# Patient Record
Sex: MALE | Race: WHITE | Employment: UNEMPLOYED | URBAN - METROPOLITAN AREA
[De-identification: names, ages, dates, MRNs, and addresses within clinical notes are randomized per-mention and may not be internally consistent; named-entity substitution may affect disease eponyms.]

---
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
## 2022-07-15 ENCOUNTER — HOSPITAL ENCOUNTER (INPATIENT)
Age: 22
LOS: 2 days | Discharge: HOME | DRG: 885 | End: 2022-07-18
Attending: EMERGENCY MEDICINE | Admitting: PSYCHIATRY & NEUROLOGY
Payer: COMMERCIAL

## 2022-07-15 DIAGNOSIS — R45.851 SUICIDAL IDEATION: Primary | ICD-10-CM

## 2022-07-15 LAB
ALBUMIN SERPL-MCNC: 5 G/DL (ref 3.5–5)
ALBUMIN/GLOB SERPL: 1.5 {RATIO} (ref 1.1–2.2)
ALP SERPL-CCNC: 103 U/L (ref 45–117)
ALT SERPL-CCNC: 31 U/L (ref 12–78)
AMPHET UR QL SCN: NEGATIVE
ANION GAP SERPL CALC-SCNC: 9 MMOL/L (ref 5–15)
APPEARANCE UR: ABNORMAL
AST SERPL-CCNC: 23 U/L (ref 15–37)
BACTERIA URNS QL MICRO: NEGATIVE /HPF
BARBITURATES UR QL SCN: NEGATIVE
BASOPHILS # BLD: 0.1 K/UL (ref 0–0.1)
BASOPHILS NFR BLD: 1 % (ref 0–1)
BENZODIAZ UR QL: POSITIVE
BILIRUB SERPL-MCNC: 1.1 MG/DL (ref 0.2–1)
BILIRUB UR QL: NEGATIVE
BUN SERPL-MCNC: 16 MG/DL (ref 6–20)
BUN/CREAT SERPL: 12 (ref 12–20)
CALCIUM SERPL-MCNC: 9.7 MG/DL (ref 8.5–10.1)
CANNABINOIDS UR QL SCN: POSITIVE
CHLORIDE SERPL-SCNC: 102 MMOL/L (ref 97–108)
CO2 SERPL-SCNC: 31 MMOL/L (ref 21–32)
COCAINE UR QL SCN: POSITIVE
COLOR UR: ABNORMAL
CREAT SERPL-MCNC: 1.32 MG/DL (ref 0.7–1.3)
DIFFERENTIAL METHOD BLD: ABNORMAL
DRUG SCRN COMMENT,DRGCM: ABNORMAL
EOSINOPHIL # BLD: 0.8 K/UL (ref 0–0.4)
EOSINOPHIL NFR BLD: 10 % (ref 0–7)
EPITH CASTS URNS QL MICRO: ABNORMAL /LPF
ERYTHROCYTE [DISTWIDTH] IN BLOOD BY AUTOMATED COUNT: 12.7 % (ref 11.5–14.5)
ETHANOL SERPL-MCNC: <10 MG/DL
FLUAV RNA SPEC QL NAA+PROBE: NOT DETECTED
FLUBV RNA SPEC QL NAA+PROBE: NOT DETECTED
GLOBULIN SER CALC-MCNC: 3.3 G/DL (ref 2–4)
GLUCOSE SERPL-MCNC: 78 MG/DL (ref 65–100)
GLUCOSE UR STRIP.AUTO-MCNC: NEGATIVE MG/DL
HCT VFR BLD AUTO: 48 % (ref 36.6–50.3)
HGB BLD-MCNC: 15.5 G/DL (ref 12.1–17)
HGB UR QL STRIP: NEGATIVE
HYALINE CASTS URNS QL MICRO: ABNORMAL /LPF (ref 0–5)
IMM GRANULOCYTES # BLD AUTO: 0.1 K/UL (ref 0–0.04)
IMM GRANULOCYTES NFR BLD AUTO: 1 % (ref 0–0.5)
KETONES UR QL STRIP.AUTO: ABNORMAL MG/DL
LEUKOCYTE ESTERASE UR QL STRIP.AUTO: NEGATIVE
LYMPHOCYTES # BLD: 2.6 K/UL (ref 0.8–3.5)
LYMPHOCYTES NFR BLD: 30 % (ref 12–49)
MCH RBC QN AUTO: 28.9 PG (ref 26–34)
MCHC RBC AUTO-ENTMCNC: 32.3 G/DL (ref 30–36.5)
MCV RBC AUTO: 89.4 FL (ref 80–99)
METHADONE UR QL: NEGATIVE
MONOCYTES # BLD: 1.1 K/UL (ref 0–1)
MONOCYTES NFR BLD: 12 % (ref 5–13)
MUCOUS THREADS URNS QL MICRO: ABNORMAL /LPF
NEUTS SEG # BLD: 4 K/UL (ref 1.8–8)
NEUTS SEG NFR BLD: 46 % (ref 32–75)
NITRITE UR QL STRIP.AUTO: NEGATIVE
NRBC # BLD: 0 K/UL (ref 0–0.01)
NRBC BLD-RTO: 0 PER 100 WBC
OPIATES UR QL: NEGATIVE
PCP UR QL: NEGATIVE
PH UR STRIP: 6.5 [PH] (ref 5–8)
PLATELET # BLD AUTO: 336 K/UL (ref 150–400)
PMV BLD AUTO: 10.3 FL (ref 8.9–12.9)
POTASSIUM SERPL-SCNC: 3.6 MMOL/L (ref 3.5–5.1)
PROT SERPL-MCNC: 8.3 G/DL (ref 6.4–8.2)
PROT UR STRIP-MCNC: 30 MG/DL
RBC # BLD AUTO: 5.37 M/UL (ref 4.1–5.7)
RBC #/AREA URNS HPF: ABNORMAL /HPF (ref 0–5)
SARS-COV-2, COV2: NOT DETECTED
SODIUM SERPL-SCNC: 142 MMOL/L (ref 136–145)
SP GR UR REFRACTOMETRY: 1.02 (ref 1–1.03)
UROBILINOGEN UR QL STRIP.AUTO: 1 EU/DL (ref 0.2–1)
WBC # BLD AUTO: 8.6 K/UL (ref 4.1–11.1)
WBC URNS QL MICRO: ABNORMAL /HPF (ref 0–4)

## 2022-07-15 PROCEDURE — 99285 EMERGENCY DEPT VISIT HI MDM: CPT

## 2022-07-15 PROCEDURE — 80307 DRUG TEST PRSMV CHEM ANLYZR: CPT

## 2022-07-15 PROCEDURE — 87636 SARSCOV2 & INF A&B AMP PRB: CPT

## 2022-07-15 PROCEDURE — 80053 COMPREHEN METABOLIC PANEL: CPT

## 2022-07-15 PROCEDURE — 81001 URINALYSIS AUTO W/SCOPE: CPT

## 2022-07-15 PROCEDURE — 82077 ASSAY SPEC XCP UR&BREATH IA: CPT

## 2022-07-15 PROCEDURE — 36415 COLL VENOUS BLD VENIPUNCTURE: CPT

## 2022-07-15 PROCEDURE — 85025 COMPLETE CBC W/AUTO DIFF WBC: CPT

## 2022-07-16 PROBLEM — F14.10 COCAINE USE DISORDER (HCC): Status: ACTIVE | Noted: 2022-07-16

## 2022-07-16 PROBLEM — F32.9 MDD (MAJOR DEPRESSIVE DISORDER): Status: ACTIVE | Noted: 2022-07-16

## 2022-07-16 LAB
ATRIAL RATE: 55 BPM
CALCULATED P AXIS, ECG09: 52 DEGREES
CALCULATED R AXIS, ECG10: 79 DEGREES
CALCULATED T AXIS, ECG11: 61 DEGREES
DIAGNOSIS, 93000: NORMAL
P-R INTERVAL, ECG05: 138 MS
Q-T INTERVAL, ECG07: 396 MS
QRS DURATION, ECG06: 84 MS
QTC CALCULATION (BEZET), ECG08: 378 MS
VENTRICULAR RATE, ECG03: 55 BPM

## 2022-07-16 PROCEDURE — 93005 ELECTROCARDIOGRAM TRACING: CPT

## 2022-07-16 PROCEDURE — 65220000003 HC RM SEMIPRIVATE PSYCH

## 2022-07-16 RX ORDER — ACETAMINOPHEN 325 MG/1
650 TABLET ORAL
Status: DISCONTINUED | OUTPATIENT
Start: 2022-07-16 | End: 2022-07-18 | Stop reason: HOSPADM

## 2022-07-16 RX ORDER — HYDROXYZINE 25 MG/1
50 TABLET, FILM COATED ORAL
Status: DISCONTINUED | OUTPATIENT
Start: 2022-07-16 | End: 2022-07-18 | Stop reason: HOSPADM

## 2022-07-16 RX ORDER — OLANZAPINE 5 MG/1
5 TABLET ORAL
Status: DISCONTINUED | OUTPATIENT
Start: 2022-07-16 | End: 2022-07-18 | Stop reason: HOSPADM

## 2022-07-16 RX ORDER — BENZTROPINE MESYLATE 1 MG/1
1 TABLET ORAL
Status: DISCONTINUED | OUTPATIENT
Start: 2022-07-16 | End: 2022-07-18 | Stop reason: HOSPADM

## 2022-07-16 RX ORDER — ADHESIVE BANDAGE
30 BANDAGE TOPICAL DAILY PRN
Status: DISCONTINUED | OUTPATIENT
Start: 2022-07-16 | End: 2022-07-18 | Stop reason: HOSPADM

## 2022-07-16 RX ORDER — MIDAZOLAM HYDROCHLORIDE 1 MG/ML
2 INJECTION, SOLUTION INTRAMUSCULAR; INTRAVENOUS
Status: DISCONTINUED | OUTPATIENT
Start: 2022-07-16 | End: 2022-07-18

## 2022-07-16 RX ORDER — DIPHENHYDRAMINE HYDROCHLORIDE 50 MG/ML
50 INJECTION, SOLUTION INTRAMUSCULAR; INTRAVENOUS
Status: DISCONTINUED | OUTPATIENT
Start: 2022-07-16 | End: 2022-07-18 | Stop reason: HOSPADM

## 2022-07-16 RX ORDER — HALOPERIDOL 5 MG/ML
5 INJECTION INTRAMUSCULAR
Status: DISCONTINUED | OUTPATIENT
Start: 2022-07-16 | End: 2022-07-18 | Stop reason: HOSPADM

## 2022-07-16 RX ORDER — LORAZEPAM 2 MG/ML
1 INJECTION INTRAMUSCULAR
Status: DISCONTINUED | OUTPATIENT
Start: 2022-07-16 | End: 2022-07-16

## 2022-07-16 RX ORDER — TRAZODONE HYDROCHLORIDE 50 MG/1
50 TABLET ORAL
Status: DISCONTINUED | OUTPATIENT
Start: 2022-07-16 | End: 2022-07-18 | Stop reason: HOSPADM

## 2022-07-16 NOTE — ED NOTES
Patient presents to the ED under an ECO with [employer]. Pt unsure why he is here. Denies HI or SI  Denies hallucinations or delusions. Reports using cocaine, xanax and weed. Denies alcohol use.

## 2022-07-16 NOTE — PROGRESS NOTES
Pt met in bedroom, appears asleep. Responds to verbal stimuli. Denies SI/HI/AVH. Denies pain, denies any further needs. Cooperative with morning vitals. Encouraged to participate in unit based activities and treatment team. Will continue to monitor.    Problem: Suicide  Goal: *STG: Remains safe in hospital  Outcome: Progressing Towards Goal  Goal: *STG/LTG: No longer expresses self destructive or suicidal thoughts  Outcome: Progressing Towards Goal
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
## 2022-07-16 NOTE — PROGRESS NOTES
[facility] Admission Pharmacy Medication Reconciliation     Information obtained from:Patient   RxQuery data available1:None    Comments/recommendations:    Medication reconciliation performed with patient via telephone. Patient denies taking any prescribed or over-the-counter medications prior to admission  Patient denies medication allergies and states preferred pharmacy as [address]. Medication changes (since last review): Added  None   Removed  None  Adjusted  None    The [place] Prescription Monitoring Program () was accessed to determine fill history of any controlled medications. No record found. 1RxQuery pharmacy benefit data reflects medications filled and processed through the patient's insurance, however                this data does NOT capture whether the medication was picked up or is currently being taken by the patient. Patient allergies:    Allergies as of 07/15/2022    (No Known Allergies)     None      Thank you,     Kira Crowe, PharmD   Contact: [phone]
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
## 2022-07-16 NOTE — PROGRESS NOTES
Auto-MST trigger per RN nutrition assessment. No acute nutrition or weight issues identified. Pt meal compliant. Double portions ordered. Hx notable for substance abuse. Ht: 5'10\"  Wt: 160 lb  BMI: 22.96 kb/(m^2) c/w normal weight.   Est energy needs: 2175 kcal, 72 g protein, 1 mL/kcal fluids  Pt will consume > 75% of meals at follow up 7-10 days  LOS, MST
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
## 2022-07-17 PROCEDURE — 74011250637 HC RX REV CODE- 250/637

## 2022-07-17 PROCEDURE — 65220000003 HC RM SEMIPRIVATE PSYCH

## 2022-07-17 RX ADMIN — TRAZODONE HYDROCHLORIDE 50 MG: 50 TABLET ORAL at 21:25

## 2022-07-17 RX ADMIN — HYDROXYZINE HYDROCHLORIDE 50 MG: 25 TABLET ORAL at 21:25

## 2022-07-17 NOTE — PROGRESS NOTES
Pt met in hallway, alert and oriented. Denies SI/HI/AVH, reports minimal depression related to situation. Reports frustration over hospitalization. Denies any further needs. Will continue to monitor.    Problem: Suicide  Goal: *STG: Remains safe in hospital  Outcome: Progressing Towards Goal  Goal: *STG/LTG: No longer expresses self destructive or suicidal thoughts  Outcome: Progressing Towards Goal

## 2022-07-17 NOTE — PROGRESS NOTES
Problem: Falls - Risk of  Goal: *Absence of Falls  Description: Document [place]via Fall Risk and appropriate interventions in the flowsheet. Outcome: Progressing Towards Goal  Note: Fall Risk Interventions:    Problem: Suicide  Goal: *STG: Remains safe in hospital  Outcome: Progressing Towards Goal     23 y/o male pt received this evening awake in [place]. Pt is A&Ox4 with \"depressed\" mood and sad affect which is mood congruent. Pt denied SI/HI/AVH. Pt expressed frustration about being hospitalized and placed under a TDO and denies having any true current or past suicidal ideation. Will continue to monitor pt. 7.25 hours of sleep logged.

## 2022-07-18 VITALS
HEIGHT: 70 IN | SYSTOLIC BLOOD PRESSURE: 133 MMHG | DIASTOLIC BLOOD PRESSURE: 71 MMHG | BODY MASS INDEX: 22.9 KG/M2 | RESPIRATION RATE: 16 BRPM | TEMPERATURE: 97.5 F | HEART RATE: 75 BPM | OXYGEN SATURATION: 100 % | WEIGHT: 160 LBS

## 2022-07-18 PROCEDURE — 74011250637 HC RX REV CODE- 250/637

## 2022-07-18 RX ADMIN — HYDROXYZINE HYDROCHLORIDE 50 MG: 25 TABLET ORAL at 09:24

## 2022-07-18 NOTE — PROGRESS NOTES
Spiritual Care Assessment/Progress Note  [facility]      NAME: Luann Lazar      MRN: [id number]  AGE: 22 y.o. SEX: male  Mormon Affiliation: No Caodaism   Language: English     7/18/2022     Total Time (in minutes): 5     Spiritual Assessment begun in [place] [id number] through conversation with:         []Patient        [] Family    [] Friend(s)        Reason for Consult: Initial/Spiritual assessment, patient floor     Spiritual beliefs: (Please include comment if needed)     [] Identifies with a nupur tradition:         [] Supported by a nupur community:            [] Claims no spiritual orientation:           [] Seeking spiritual identity:                [] Adheres to an individual form of spirituality:           [x] Not able to assess:                           Identified resources for coping:      [] Prayer                               [] Music                  [] Guided Imagery     [] Family/friends                 [] Pet visits     [] Devotional reading                         [x] Unknown     [] Other:                                    Interventions offered during this visit: (See comments for more details)                Plan of Care:     [] Support spiritual and/or cultural needs    [] Support AMD and/or advance care planning process      [] Support grieving process   [] Coordinate Rites and/or Rituals    [] Coordination with community clergy   [] No spiritual needs identified at this time   [] Detailed Plan of Care below (See Comments)  [] Make referral to Music Therapy  [] Make referral to Pet Therapy     [] Make referral to Addiction services  [] Make referral to [facility]  [] Make referral to Spiritual Care Partner  [] No future visits requested        [x] Contact Spiritual Care for further referrals     Comments:  visit for initial spiritual assessment. Patient resting at the time of this visit.   Please contact spiritual care for further referral or consult. Rev.  Vasile Magana MDiv, [facility], [facility] paging service: [phone]

## 2022-07-18 NOTE — BH NOTES
PSYCHIATRIC PROGRESS NOTE       Patient: Dalila Tamayo MRN: [id number]  SSN: [id number]    YOB: 2000  Age: 22 y.o. Sex: male      Admit Date: 7/15/2022    LOS: 1 day       Chief Complaint:  I am now a tdo. Interval History:  Dalila Tamayo says he is fine. States he spoke to [facility] yesterday and is now a tdo. His hearing is scheduled in the morning. Says he's been talking to his parents and it went well. His plan is to go to rehab. He says he is a little depressed, which he thinks is from substance use. He declined to be started on any medication for mood. He denies si hi or avh. Calm and pleasant during the interview. Nursing staff report no issues. Past Medical History:  Past Medical History:   Diagnosis Date    Asthma     Impetigo          ALLERGIES:(reviewed/updated 7/17/2022)  No Known Allergies    Laboratory report:  Lab Results   Component Value Date/Time    WBC 8.6 07/15/2022 10:57 PM    HGB 15.5 07/15/2022 10:57 PM    HCT 48.0 07/15/2022 10:57 PM    PLATELET 661 54/03/7138 10:57 PM    MCV 89.4 07/15/2022 10:57 PM      Lab Results   Component Value Date/Time    Sodium 142 07/15/2022 10:57 PM    Potassium 3.6 07/15/2022 10:57 PM    Chloride 102 07/15/2022 10:57 PM    CO2 31 07/15/2022 10:57 PM    Anion gap 9 07/15/2022 10:57 PM    Glucose 78 07/15/2022 10:57 PM    BUN 16 07/15/2022 10:57 PM    Creatinine 1.32 (H) 07/15/2022 10:57 PM    BUN/Creatinine ratio 12 07/15/2022 10:57 PM    GFR est AA >60 07/15/2022 10:57 PM    GFR est non-AA >60 07/15/2022 10:57 PM    Calcium 9.7 07/15/2022 10:57 PM    Bilirubin, total 1.1 (H) 07/15/2022 10:57 PM    Alk.  phosphatase 103 07/15/2022 10:57 PM    Protein, total 8.3 (H) 07/15/2022 10:57 PM    Albumin 5.0 07/15/2022 10:57 PM    Globulin 3.3 07/15/2022 10:57 PM    A-G Ratio 1.5 07/15/2022 10:57 PM    ALT (SGPT) 31 07/15/2022 10:57 PM      Vitals:    07/16/22 0245 07/16/22 0823 07/16/22 2020 07/17/22 0748   BP: 134/83 (!) 117/50 130/79 (!) 134/91 Pulse: 83 60 68 (!) 56   Resp: 16 16 16 18   Temp: 97.4 °F (36.3 °C) 97.7 °F (36.5 °C) 98.4 °F (36.9 °C) 97.7 °F (36.5 °C)   SpO2: 100% 99% 99% 100%   Weight:       Height:           No results found for: VALF2, VALAC, VALP, VALPR, DS6, CRBAM, CRBAMP, CARB2, XCRBAM  No results found for: LITHM    Vital Signs  Patient Vitals for the past 24 hrs:   Temp Pulse Resp BP SpO2   07/17/22 0748 97.7 °F (36.5 °C) (!) 56 18 (!) 134/91 100 %     Wt Readings from Last 3 Encounters:   07/15/22 72.6 kg (160 lb)   08/23/18 62.6 kg (47 %, Z= -0.09)*   03/25/16 58.4 kg (71 %, Z= 0.55)*     * Growth percentiles are based on [facility] (Boys, 2-20 Years) data. Temp Readings from Last 3 Encounters:   07/17/22 97.7 °F (36.5 °C)   08/23/18 98.3 °F (36.8 °C)   03/25/16 98.9 °F (37.2 °C)     BP Readings from Last 3 Encounters:   07/17/22 (!) 134/91   08/23/18 91/57 (1 %, Z = -2.33 /  18 %, Z = -0.92)*   03/25/16 124/45     *BP percentiles are based on the 2017 AAP Clinical Practice Guideline for boys     Pulse Readings from Last 3 Encounters:   07/17/22 (!) 56   08/23/18 75   03/25/16 74       Radiology (reviewed/updated 7/17/2022)  No results found.     Current Facility-Administered Medications   Medication Dose Route Frequency Provider Last Rate Last Admin    OLANZapine (ZyPREXA) tablet 5 mg  5 mg Oral Q6H PRN Charls Perla, NP        haloperidol lactate (HALDOL) injection 5 mg  5 mg IntraMUSCular Q6H PRN Charls Perla, NP        benztropine (COGENTIN) tablet 1 mg  1 mg Oral BID PRN Charls Perla, NP        diphenhydrAMINE (BENADRYL) injection 50 mg  50 mg IntraMUSCular BID PRN Charls Perla, NP        hydrOXYzine HCL (ATARAX) tablet 50 mg  50 mg Oral TID PRN Charls Perla, NP        traZODone (DESYREL) tablet 50 mg  50 mg Oral QHS PRN Charls Perla, NP        acetaminophen (TYLENOL) tablet 650 mg  650 mg Oral Q4H PRN Charls Perla, NP        magnesium hydroxide (MILK OF MAGNESIA) 400 mg/5 mL oral suspension 30 mL  30 mL Oral DAILY PRN Afshan Arnold NP        midazolam (VERSED) injection 2 mg  2 mg IntraMUSCular Q4H PRN Shelly Evans MD           Side Effects: (reviewed/updated 7/17/2022)  None reported or admitted to. Review of Systems: (reviewed/updated 7/17/2022)  Appetite: good  Sleep: good   All other Review of Systems: negative    Mental Status Exam:  Eye contact: Good eye contact  Psychomotor activity: wnl  Speech is spontaneous  Thought process: Logical and goal directed   Mood is \"ok\"  Affect:Blunted  Perception: No avh  Suicidal ideation: No si  Homicidal ideation: No hi  Insight/judgment: Partial   Cognition is grossly intact      Physical Exam:  Musculoskeletal system: steady gait  Tremor not present  Cog wheeling not present      Assessment and Plan:  Srinivasan Shah meets criteria for a diagnosis of Unspecified mood disorder. Benzodiazepine use disorder. Stimulant use disorder, cocaine. Cannabis use disorder. TDO hearing in am.  Continue current medications as prescribed. We will closely monitor for safety. We will encourage reality orientation. Disposition planning to continue. I certify that this patients inpatient psychiatric hospital services furnished since the previous certification were, and continue to be, required for treatment that could reasonably be expected to improve the patient's condition, or for diagnostic study, and that the patient continues to need, on a daily basis, active treatment furnished directly by or requiring the supervision of inpatient psychiatric facility personnel. In addition, the hospital records show that services furnished were intensive treatment services, admission or related services, or equivalent services.       Signed:  Fernanda Razo NP  7/17/2022

## 2022-07-18 NOTE — PROGRESS NOTES
Matty Escobar presented alert and oriented x4, broad affect, mood anxious. He was noted in the dayroom talking on the phone. He denied SI/HI/AVH. He received Atarax for anxiety and Trazodone for sleep. Pt appeared to have slept approx 8 hours during the night. No distress observed. Monitoring for safety continues.

## 2022-07-18 NOTE — BH NOTES
This writer spoke with patient's mother Gali Christiansen [phone]. She reports that she is able to  patient when she gets off at 2:30. She reports that she does not have any concerns for his discharge and that outpatient services are already set up for him.          JASMIN Fernández

## 2022-07-18 NOTE — BH NOTES
Attempted to contact patient's mother Mc Patel to inform her that patient should not have access to guns and find out where the guns have been removed too. No answer left voicemail        Spoke with Kellen Dang, he reports that the following was discussed during the hearing (with mom being present via telephone). Sugar Onofre reports that patient testified on the record to relinquish his rights to his guns. Patient's ex girlfriend has access to the guns.  Patient will not have access to guns until he completes and outpatient program.     Jessica Sosa, MSW

## 2022-07-18 NOTE — PROGRESS NOTES
Problem: Anxiety  Goal: *Alleviation of anxiety  Outcome: Progressing Towards Goal     Problem: Suicide  Goal: *STG: Remains safe in hospital  Outcome: Progressing Towards Goal  Goal: *STG/LTG: No longer expresses self destructive or suicidal thoughts  Outcome: Progressing Towards Goal     Problem: Falls - Risk of  Goal: *Absence of Falls  Description: Document [place] Fall Risk and appropriate interventions in the flowsheet.   Outcome: Progressing Towards Goal  Note: Fall Risk Interventions:     Medication Interventions: Teach patient to arise slowly

## 2022-07-18 NOTE — BH NOTES
GROUP THERAPY PROGRESS NOTE    Kristie Newman is participating in Safety Planning group    Group time: 1 hour    Personal goal for participation: To prepare for discharge by completing a safety plan and discussing their answers with the group. Also to participate in breathing exercises and individual activity. Goal orientation: personal    Group therapy participation: minimal    Therapeutic interventions reviewed and discussed:     Impression of participation: The pt remained in session and completed the safety plan but did not engage in the group discussion.

## 2022-07-18 NOTE — GROUP NOTE
LAURA  GROUP DOCUMENTATION INDIVIDUAL                                                                          Group Therapy Note    Date: 7/18/2022    Group Start Time: 1000  Group End Time: 1100  Group Topic: Topic Group    [facility] [place] [address] ACUTE [facility]AV [facility]    [place], [address] [facility] GROUP DOCUMENTATION GROUP    Group Therapy Note    Attendees: 7         Attendance: Attended    Patient's Goal:  To identify positive and negative coping skills    Interventions/techniques: Supported-discussion    Follows Directions:  Followed directions    Interactions: Interacted appropriately    Mental Status: Calm    Behavior/appearance: Attentive, Cooperative and Needed prompting    Goals Achieved: Able to engage in interactions, Able to listen to others, Able to self-disclose and Discussed coping      Lucy Matias

## 2022-07-18 NOTE — PROGRESS NOTES
Patient received resting in his room. Denies SI/HI/AVH and depression, endorses an anxiety level of 9. Calm, cooperative, pleasant, out on the unit, isolative to self. No scheduled morning medications. PRN Atarax administered for anxiety. Will continue to monitor for safety. 1530:  Patient released to the custody of his mother. All belongings returned. Discharge instructions reviewed with patient.
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
## 2023-06-08 ENCOUNTER — APPOINTMENT (OUTPATIENT)
Facility: HOSPITAL | Age: 23
End: 2023-06-08
Payer: COMMERCIAL

## 2023-06-08 ENCOUNTER — HOSPITAL ENCOUNTER (EMERGENCY)
Facility: HOSPITAL | Age: 23
Discharge: HOME | End: 2023-06-08
Attending: EMERGENCY MEDICINE
Payer: COMMERCIAL

## 2023-06-08 VITALS
WEIGHT: 146.39 LBS | SYSTOLIC BLOOD PRESSURE: 132 MMHG | OXYGEN SATURATION: 97 % | HEIGHT: 68 IN | BODY MASS INDEX: 22.19 KG/M2 | DIASTOLIC BLOOD PRESSURE: 77 MMHG | RESPIRATION RATE: 16 BRPM | HEART RATE: 65 BPM | TEMPERATURE: 97.5 F

## 2023-06-08 DIAGNOSIS — F41.1 ANXIETY STATE: ICD-10-CM

## 2023-06-08 DIAGNOSIS — S43.015A ANTERIOR DISLOCATION OF LEFT SHOULDER, INITIAL ENCOUNTER: Primary | ICD-10-CM

## 2023-06-08 PROCEDURE — 96374 THER/PROPH/DIAG INJ IV PUSH: CPT

## 2023-06-08 PROCEDURE — 6360000002 HC RX W HCPCS

## 2023-06-08 PROCEDURE — 73200 CT UPPER EXTREMITY W/O DYE: CPT

## 2023-06-08 PROCEDURE — 6360000002 HC RX W HCPCS: Performed by: EMERGENCY MEDICINE

## 2023-06-08 PROCEDURE — 6370000000 HC RX 637 (ALT 250 FOR IP)

## 2023-06-08 PROCEDURE — 23655 CLTX SHO DSLC W/MNPJ W/ANES: CPT

## 2023-06-08 PROCEDURE — 99285 EMERGENCY DEPT VISIT HI MDM: CPT

## 2023-06-08 RX ORDER — PROPOFOL 10 MG/ML
INJECTION, EMULSION INTRAVENOUS
Status: COMPLETED
Start: 2023-06-08 | End: 2023-06-08

## 2023-06-08 RX ORDER — FENTANYL CITRATE 50 UG/ML
INJECTION, SOLUTION INTRAMUSCULAR; INTRAVENOUS
Status: COMPLETED
Start: 2023-06-08 | End: 2023-06-08

## 2023-06-08 RX ORDER — PROPOFOL 10 MG/ML
INJECTION, EMULSION INTRAVENOUS
Status: DISCONTINUED
Start: 2023-06-08 | End: 2023-06-08 | Stop reason: HOSPADM

## 2023-06-08 RX ORDER — PROPOFOL 10 MG/ML
100 INJECTION, EMULSION INTRAVENOUS
Status: COMPLETED | OUTPATIENT
Start: 2023-06-08 | End: 2023-06-08

## 2023-06-08 RX ORDER — IBUPROFEN 800 MG/1
800 TABLET ORAL EVERY 8 HOURS PRN
Qty: 30 TABLET | Refills: 0 | Status: SHIPPED | OUTPATIENT
Start: 2023-06-08

## 2023-06-08 RX ORDER — LORAZEPAM 1 MG/1
2 TABLET ORAL ONCE
Status: COMPLETED | OUTPATIENT
Start: 2023-06-08 | End: 2023-06-08

## 2023-06-08 RX ORDER — LIDOCAINE HYDROCHLORIDE 10 MG/ML
20 INJECTION, SOLUTION EPIDURAL; INFILTRATION; INTRACAUDAL; PERINEURAL ONCE
Status: DISCONTINUED | OUTPATIENT
Start: 2023-06-08 | End: 2023-06-08

## 2023-06-08 RX ADMIN — FENTANYL CITRATE 100 MCG: 50 INJECTION, SOLUTION INTRAMUSCULAR; INTRAVENOUS at 14:41

## 2023-06-08 RX ADMIN — LORAZEPAM 2 MG: 1 TABLET ORAL at 14:16

## 2023-06-08 RX ADMIN — PROPOFOL 50 MG: 10 INJECTION, EMULSION INTRAVENOUS at 14:41

## 2023-06-08 RX ADMIN — PROPOFOL 100 MG: 10 INJECTION, EMULSION INTRAVENOUS at 14:26

## 2023-06-08 ASSESSMENT — LIFESTYLE VARIABLES
HOW MANY STANDARD DRINKS CONTAINING ALCOHOL DO YOU HAVE ON A TYPICAL DAY: PATIENT DOES NOT DRINK
HOW OFTEN DO YOU HAVE A DRINK CONTAINING ALCOHOL: MONTHLY OR LESS

## 2023-06-08 ASSESSMENT — ENCOUNTER SYMPTOMS
CHEST TIGHTNESS: 0
COUGH: 0
SHORTNESS OF BREATH: 0
GASTROINTESTINAL NEGATIVE: 1
STRIDOR: 0
WHEEZING: 0

## 2023-06-08 ASSESSMENT — PAIN - FUNCTIONAL ASSESSMENT: PAIN_FUNCTIONAL_ASSESSMENT: 0-10

## 2023-06-08 ASSESSMENT — PAIN SCALES - GENERAL
PAINLEVEL_OUTOF10: 2
PAINLEVEL_OUTOF10: 9

## 2023-06-08 ASSESSMENT — PAIN DESCRIPTION - LOCATION: LOCATION: SHOULDER

## 2023-06-08 NOTE — CONSULTS
procedure. Plan:     Continue to wear the sling, okay to remove for bathing, but keep arm by your side  Do not lift the left arm away from your body  OK for active range of motion of the hand, wrist, elbow    CT scan ordered for bony evaluation of the GH/ surrounding structures, surgical planning     Dr. Walter Ryan aware and agrees with plan as above.         Andrade Cunningham, PA-C  [employer]

## 2023-06-08 NOTE — ED NOTES
Pulled fentanyl 100 mg and propofol on override to assist MD and primary RN with conscious sedation procedure. Unable to return initial larger bottle of propofol. Gave to Emma pharmacist to place back in [facility]. Only needed smaller dose for pt.      Emelyn Buckley RN  06/08/23 220 Doris Mayer RN  06/08/23 5831

## 2023-06-08 NOTE — ED PROVIDER NOTES
Attendance: Constant attendance by certified staff until patient recovered      Recovery: Patient returned to pre-procedure baseline      Post-sedation assessments completed and reviewed: airway patency      Specimens recovered:  None    Patient is stable for discharge or admission: yes      Procedure completion:  Tolerated well, no immediate complications       Damien Abbott MD  06/08/23 6104
grammatical, syntax, homophones, and other interpretive errors are inadvertently transcribed by the computer software. Please disregards these errors.  Please excuse any errors that have escaped final proofreading.)        ELEAZAR Lawrence CNP  06/08/23 [id number], APRN - CNP  06/08/23 6992

## 2023-06-08 NOTE — ED NOTES
Patient received Propofol via Dr Maria Guadalupe Perrin:    1426 60MG  1427 40mg  1428: 50mg  1430: 50mg  1441: 50mg    Ayesha Lucas RN  06/08/23 [address], RN  06/08/23 2663

## 2025-08-21 ENCOUNTER — APPOINTMENT (OUTPATIENT)
Facility: HOSPITAL | Age: 25
End: 2025-08-21
Payer: COMMERCIAL

## 2025-08-21 ENCOUNTER — HOSPITAL ENCOUNTER (EMERGENCY)
Facility: HOSPITAL | Age: 25
Discharge: HOME | End: 2025-08-22
Payer: COMMERCIAL

## 2025-08-21 VITALS
SYSTOLIC BLOOD PRESSURE: 124 MMHG | TEMPERATURE: 97.5 F | WEIGHT: 159.39 LBS | BODY MASS INDEX: 22.31 KG/M2 | RESPIRATION RATE: 16 BRPM | HEIGHT: 71 IN | OXYGEN SATURATION: 97 % | HEART RATE: 100 BPM | DIASTOLIC BLOOD PRESSURE: 81 MMHG

## 2025-08-21 DIAGNOSIS — S42.291A HILL SACHS DEFORMITY, RIGHT: ICD-10-CM

## 2025-08-21 DIAGNOSIS — M25.511 ACUTE PAIN OF RIGHT SHOULDER: Primary | ICD-10-CM

## 2025-08-21 PROCEDURE — 99283 EMERGENCY DEPT VISIT LOW MDM: CPT

## 2025-08-21 PROCEDURE — 73030 X-RAY EXAM OF SHOULDER: CPT

## 2025-08-21 ASSESSMENT — PAIN SCALES - GENERAL: PAINLEVEL_OUTOF10: 9

## 2025-08-21 ASSESSMENT — PAIN DESCRIPTION - LOCATION: LOCATION: SHOULDER

## 2025-08-21 ASSESSMENT — LIFESTYLE VARIABLES
HOW OFTEN DO YOU HAVE A DRINK CONTAINING ALCOHOL: NEVER
HOW MANY STANDARD DRINKS CONTAINING ALCOHOL DO YOU HAVE ON A TYPICAL DAY: PATIENT DOES NOT DRINK

## 2025-08-21 ASSESSMENT — PAIN DESCRIPTION - ORIENTATION: ORIENTATION: RIGHT

## 2025-08-21 ASSESSMENT — PAIN DESCRIPTION - DESCRIPTORS: DESCRIPTORS: ACHING
